# Patient Record
Sex: FEMALE | Race: WHITE | NOT HISPANIC OR LATINO | URBAN - METROPOLITAN AREA
[De-identification: names, ages, dates, MRNs, and addresses within clinical notes are randomized per-mention and may not be internally consistent; named-entity substitution may affect disease eponyms.]

---

## 2022-03-15 ENCOUNTER — COMPREHENSIVE EXAM (OUTPATIENT)
Dept: URBAN - METROPOLITAN AREA CLINIC 16 | Facility: CLINIC | Age: 62
End: 2022-03-15

## 2022-03-15 DIAGNOSIS — H25.13: ICD-10-CM

## 2022-03-15 DIAGNOSIS — H04.123: ICD-10-CM

## 2022-03-15 DIAGNOSIS — H43.812: ICD-10-CM

## 2022-03-15 DIAGNOSIS — Z01.00: ICD-10-CM

## 2022-03-15 DIAGNOSIS — H52.4: ICD-10-CM

## 2022-03-15 DIAGNOSIS — H52.03: ICD-10-CM

## 2022-03-15 DIAGNOSIS — H52.201: ICD-10-CM

## 2022-03-15 PROCEDURE — 92015 DETERMINE REFRACTIVE STATE: CPT

## 2022-03-15 PROCEDURE — 92004 COMPRE OPH EXAM NEW PT 1/>: CPT

## 2022-03-15 ASSESSMENT — KERATOMETRY
OS_AXISANGLE_DEGREES: 9
OD_AXISANGLE_DEGREES: 159
OS_K1POWER_DIOPTERS: 46.50
OD_AXISANGLE2_DEGREES: 69
OS_K2POWER_DIOPTERS: 47.50
OD_K1POWER_DIOPTERS: 46.50
OS_AXISANGLE2_DEGREES: 99
OD_K2POWER_DIOPTERS: 47.50

## 2022-03-15 ASSESSMENT — TONOMETRY
OD_IOP_MMHG: 15
OS_IOP_MMHG: 15

## 2022-03-15 NOTE — PATIENT DISCUSSION
Referred to Dr. Dat Kulkarni to explore options.  Texted him to tell him about her complaints and why I sent her over there.  Photodocumented.

## 2022-03-15 NOTE — PATIENT DISCUSSION
Revised spec rx.  Patient improved on the chart with the change and agreed that revised rx is improved.

## 2022-03-15 NOTE — PATIENT DISCUSSION
**** Patient saw Dr. 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

## 2023-05-08 ENCOUNTER — ESTABLISHED PATIENT (OUTPATIENT)
Dept: URBAN - METROPOLITAN AREA CLINIC 14 | Facility: CLINIC | Age: 63
End: 2023-05-08

## 2023-05-08 DIAGNOSIS — H25.13: ICD-10-CM

## 2023-05-08 DIAGNOSIS — H18.513: ICD-10-CM

## 2023-05-08 PROCEDURE — 92014 COMPRE OPH EXAM EST PT 1/>: CPT

## 2023-05-08 PROCEDURE — 92286 ANT SGM IMG I&R SPECLR MIC: CPT

## 2023-05-08 ASSESSMENT — VISUAL ACUITY
OS_BCVA: 20/400
OD_CC: 20/40
OD_BCVA: 20/25
OD_GLARE: 20/400
OS_CC: 20/400

## 2023-05-08 ASSESSMENT — TONOMETRY
OS_IOP_MMHG: 9
OD_IOP_MMHG: 14

## 2023-05-11 ENCOUNTER — ESTABLISHED PATIENT (OUTPATIENT)
Dept: URBAN - METROPOLITAN AREA CLINIC 14 | Facility: CLINIC | Age: 63
End: 2023-05-11

## 2023-05-11 DIAGNOSIS — Z98.890: ICD-10-CM

## 2023-05-11 DIAGNOSIS — H25.13: ICD-10-CM

## 2023-05-11 PROCEDURE — 92012 INTRM OPH EXAM EST PATIENT: CPT

## 2023-05-11 ASSESSMENT — VISUAL ACUITY
OD_CC: 20/30-1
OU_CC: 20/30-1
OS_CC: 20/200

## 2023-05-11 ASSESSMENT — TONOMETRY
OD_IOP_MMHG: 17
OS_IOP_MMHG: 11

## 2023-05-15 ENCOUNTER — FOLLOW UP (OUTPATIENT)
Dept: URBAN - METROPOLITAN AREA CLINIC 14 | Facility: CLINIC | Age: 63
End: 2023-05-15

## 2023-05-15 DIAGNOSIS — H25.13: ICD-10-CM

## 2023-05-15 DIAGNOSIS — Z98.890: ICD-10-CM

## 2023-05-15 PROCEDURE — 92012 INTRM OPH EXAM EST PATIENT: CPT

## 2023-05-15 ASSESSMENT — VISUAL ACUITY
OU_CC: 20/40
OD_CC: 20/40
OS_CC: 20/200

## 2023-05-15 ASSESSMENT — TONOMETRY
OD_IOP_MMHG: 14
OS_IOP_MMHG: 10

## 2023-05-22 ENCOUNTER — FOLLOW UP (OUTPATIENT)
Dept: URBAN - METROPOLITAN AREA CLINIC 14 | Facility: CLINIC | Age: 63
End: 2023-05-22

## 2023-05-22 DIAGNOSIS — H35.371: ICD-10-CM

## 2023-05-22 DIAGNOSIS — H25.13: ICD-10-CM

## 2023-05-22 DIAGNOSIS — H18.513: ICD-10-CM

## 2023-05-22 DIAGNOSIS — Z98.890: ICD-10-CM

## 2023-05-22 PROCEDURE — 92012 INTRM OPH EXAM EST PATIENT: CPT

## 2023-05-22 ASSESSMENT — VISUAL ACUITY
OU_CC: 20/40
OD_CC: 20/40
OS_CC: 20/400

## 2023-05-22 ASSESSMENT — TONOMETRY
OD_IOP_MMHG: 14
OS_IOP_MMHG: 10

## 2023-05-31 ENCOUNTER — FOLLOW UP (OUTPATIENT)
Dept: URBAN - METROPOLITAN AREA CLINIC 14 | Facility: CLINIC | Age: 63
End: 2023-05-31

## 2023-05-31 DIAGNOSIS — H25.13: ICD-10-CM

## 2023-05-31 DIAGNOSIS — H18.513: ICD-10-CM

## 2023-05-31 DIAGNOSIS — H35.371: ICD-10-CM

## 2023-05-31 DIAGNOSIS — Z98.890: ICD-10-CM

## 2023-05-31 PROCEDURE — 92012 INTRM OPH EXAM EST PATIENT: CPT

## 2023-05-31 ASSESSMENT — TONOMETRY
OD_IOP_MMHG: 14
OS_IOP_MMHG: 13

## 2023-05-31 ASSESSMENT — VISUAL ACUITY
OU_CC: 20/40
OS_CC: 20/200
OD_CC: 20/30-1

## 2023-06-12 ENCOUNTER — ESTABLISHED PATIENT (OUTPATIENT)
Dept: URBAN - METROPOLITAN AREA CLINIC 14 | Facility: CLINIC | Age: 63
End: 2023-06-12

## 2023-06-12 DIAGNOSIS — H18.513: ICD-10-CM

## 2023-06-12 DIAGNOSIS — H25.13: ICD-10-CM

## 2023-06-12 PROCEDURE — 99214 OFFICE O/P EST MOD 30 MIN: CPT

## 2023-06-12 PROCEDURE — 76510 OPH US DX B-SCAN&QUAN A-SCAN: CPT

## 2023-06-12 PROCEDURE — 92286 ANT SGM IMG I&R SPECLR MIC: CPT

## 2023-06-12 PROCEDURE — 92136 OPHTHALMIC BIOMETRY: CPT

## 2023-06-12 ASSESSMENT — KERATOMETRY
OS_K2POWER_DIOPTERS: 47.25
OS_K1POWER_DIOPTERS: 46.50
OD_K2POWER_DIOPTERS: 47.50
OD_K1POWER_DIOPTERS: 46.75
OD_AXISANGLE2_DEGREES: 67
OD_AXISANGLE_DEGREES: 157
OS_AXISANGLE_DEGREES: 28
OS_AXISANGLE2_DEGREES: 118

## 2023-06-12 ASSESSMENT — TONOMETRY
OS_IOP_MMHG: 14
OD_IOP_MMHG: 14

## 2023-06-12 ASSESSMENT — VISUAL ACUITY
OD_GLARE: 20/400
OD_BCVA: 20/25
OS_BCVA: 20/400
OD_CC: 20/30
OS_CC: 20/400

## 2023-06-21 ENCOUNTER — FOLLOW UP (OUTPATIENT)
Dept: URBAN - METROPOLITAN AREA CLINIC 14 | Facility: CLINIC | Age: 63
End: 2023-06-21

## 2023-06-21 DIAGNOSIS — H40.052: ICD-10-CM

## 2023-06-21 PROCEDURE — 92012 INTRM OPH EXAM EST PATIENT: CPT

## 2023-06-21 ASSESSMENT — KERATOMETRY
OD_AXISANGLE2_DEGREES: 67
OS_AXISANGLE_DEGREES: 28
OS_K1POWER_DIOPTERS: 46.50
OD_K1POWER_DIOPTERS: 46.75
OD_AXISANGLE_DEGREES: 157
OS_K2POWER_DIOPTERS: 47.25
OD_K2POWER_DIOPTERS: 47.50
OS_AXISANGLE2_DEGREES: 118

## 2023-06-21 ASSESSMENT — VISUAL ACUITY
OD_CC: 20/30
OS_PH: 20/80
OS_CC: 20/200

## 2023-06-21 ASSESSMENT — TONOMETRY
OS_IOP_MMHG: 15
OD_IOP_MMHG: 14

## 2023-06-29 ENCOUNTER — ESTABLISHED PATIENT (OUTPATIENT)
Dept: URBAN - METROPOLITAN AREA CLINIC 16 | Facility: CLINIC | Age: 63
End: 2023-06-29

## 2023-06-29 DIAGNOSIS — H25.13: ICD-10-CM

## 2023-06-29 DIAGNOSIS — H35.371: ICD-10-CM

## 2023-06-29 DIAGNOSIS — H35.431: ICD-10-CM

## 2023-06-29 PROCEDURE — 92014 COMPRE OPH EXAM EST PT 1/>: CPT

## 2023-06-29 PROCEDURE — 92134 CPTRZ OPH DX IMG PST SGM RTA: CPT

## 2023-06-29 PROCEDURE — 92201 OPSCPY EXTND RTA DRAW UNI/BI: CPT

## 2023-06-29 ASSESSMENT — VISUAL ACUITY
OS_CC: 20/100-1
OS_PH: 20/60
OD_CC: 20/20-1

## 2023-06-29 ASSESSMENT — TONOMETRY
OS_IOP_MMHG: 12
OD_IOP_MMHG: 16

## 2023-07-26 ENCOUNTER — POST-OP (OUTPATIENT)
Dept: URBAN - METROPOLITAN AREA CLINIC 16 | Facility: CLINIC | Age: 63
End: 2023-07-26

## 2023-07-26 DIAGNOSIS — Z96.1: ICD-10-CM

## 2023-07-26 PROCEDURE — 99024NOCM NON COMANAGED POST OP CARE

## 2023-07-26 ASSESSMENT — VISUAL ACUITY
OD_CC: 20/20
OS_SC: 20/30-1
OD_SC: 20/200

## 2023-07-26 ASSESSMENT — TONOMETRY
OD_IOP_MMHG: 16
OS_IOP_MMHG: 11

## 2023-08-07 ENCOUNTER — POST-OP (OUTPATIENT)
Dept: URBAN - METROPOLITAN AREA CLINIC 14 | Facility: CLINIC | Age: 63
End: 2023-08-07

## 2023-08-07 DIAGNOSIS — Z96.1: ICD-10-CM

## 2023-08-07 DIAGNOSIS — H25.11: ICD-10-CM

## 2023-08-07 PROCEDURE — 92136 OPHTHALMIC BIOMETRY: CPT

## 2023-08-07 PROCEDURE — 99024 POSTOP FOLLOW-UP VISIT: CPT

## 2023-08-07 ASSESSMENT — TONOMETRY: OS_IOP_MMHG: 14

## 2023-08-07 ASSESSMENT — VISUAL ACUITY
OS_BCVA: 20/30
OS_SC: 20/40
OD_CC: 20/20

## 2023-08-17 ENCOUNTER — POST-OP (OUTPATIENT)
Dept: URBAN - METROPOLITAN AREA CLINIC 14 | Facility: CLINIC | Age: 63
End: 2023-08-17

## 2023-08-17 DIAGNOSIS — Z96.1: ICD-10-CM

## 2023-08-17 PROCEDURE — 99024 POSTOP FOLLOW-UP VISIT: CPT

## 2023-08-17 ASSESSMENT — TONOMETRY
OD_IOP_MMHG: 11
OS_IOP_MMHG: 9

## 2023-08-17 ASSESSMENT — VISUAL ACUITY
OS_PH: 20/40
OS_SC: 20/60-1
OD_SC: 20/70+1
OD_PH: 20/40

## 2023-09-18 ENCOUNTER — POST-OP (OUTPATIENT)
Dept: URBAN - METROPOLITAN AREA CLINIC 14 | Facility: CLINIC | Age: 63
End: 2023-09-18

## 2023-09-18 DIAGNOSIS — H35.352: ICD-10-CM

## 2023-09-18 DIAGNOSIS — Z96.1: ICD-10-CM

## 2023-09-18 PROCEDURE — 99024 POSTOP FOLLOW-UP VISIT: CPT

## 2023-09-18 RX ORDER — PREDNISOLONE ACETATE 10 MG/ML: 1 SUSPENSION/ DROPS OPHTHALMIC

## 2023-09-18 RX ORDER — BROMFENAC SODIUM 0.7 MG/ML: 1 SOLUTION/ DROPS OPHTHALMIC ONCE A DAY

## 2023-09-18 ASSESSMENT — KERATOMETRY
OS_K2POWER_DIOPTERS: 47.75
OD_K2POWER_DIOPTERS: 48.00
OS_AXISANGLE_DEGREES: 23
OD_K1POWER_DIOPTERS: 47.00
OD_AXISANGLE_DEGREES: 160
OS_K1POWER_DIOPTERS: 47.00
OS_AXISANGLE2_DEGREES: 113
OD_AXISANGLE2_DEGREES: 70

## 2023-09-18 ASSESSMENT — TONOMETRY
OD_IOP_MMHG: 10
OS_IOP_MMHG: 15

## 2023-09-18 ASSESSMENT — VISUAL ACUITY
OD_BCVA: 20/25
OD_SC: 20/50
OS_BCVA: 20/60
OD_PH: 20/40
OS_SC: 20/100

## 2023-09-22 ENCOUNTER — ESTABLISHED PATIENT (OUTPATIENT)
Dept: URBAN - METROPOLITAN AREA CLINIC 11 | Facility: CLINIC | Age: 63
End: 2023-09-22

## 2023-09-22 DIAGNOSIS — H35.352: ICD-10-CM

## 2023-09-22 DIAGNOSIS — H43.811: ICD-10-CM

## 2023-09-22 DIAGNOSIS — H35.431: ICD-10-CM

## 2023-09-22 DIAGNOSIS — H35.371: ICD-10-CM

## 2023-09-22 PROCEDURE — 92201 OPSCPY EXTND RTA DRAW UNI/BI: CPT

## 2023-09-22 PROCEDURE — 99213 OFFICE O/P EST LOW 20 MIN: CPT

## 2023-09-22 PROCEDURE — 92134 CPTRZ OPH DX IMG PST SGM RTA: CPT

## 2023-09-22 ASSESSMENT — KERATOMETRY
OD_AXISANGLE_DEGREES: 160
OS_AXISANGLE_DEGREES: 23
OD_K2POWER_DIOPTERS: 48.00
OS_AXISANGLE2_DEGREES: 113
OD_AXISANGLE2_DEGREES: 70
OS_K2POWER_DIOPTERS: 47.75
OD_K1POWER_DIOPTERS: 47.00
OS_K1POWER_DIOPTERS: 47.00

## 2023-09-22 ASSESSMENT — VISUAL ACUITY
OD_SC: 20/50
OS_SC: 20/100

## 2023-09-22 ASSESSMENT — TONOMETRY
OS_IOP_MMHG: 15
OD_IOP_MMHG: 13

## 2023-10-05 ENCOUNTER — ESTABLISHED PATIENT (OUTPATIENT)
Dept: URBAN - METROPOLITAN AREA CLINIC 16 | Facility: CLINIC | Age: 63
End: 2023-10-05

## 2023-10-05 DIAGNOSIS — H35.371: ICD-10-CM

## 2023-10-05 DIAGNOSIS — H35.431: ICD-10-CM

## 2023-10-05 DIAGNOSIS — H35.352: ICD-10-CM

## 2023-10-05 DIAGNOSIS — H43.811: ICD-10-CM

## 2023-10-05 PROCEDURE — 92134 CPTRZ OPH DX IMG PST SGM RTA: CPT

## 2023-10-05 PROCEDURE — 99213 OFFICE O/P EST LOW 20 MIN: CPT

## 2023-10-05 ASSESSMENT — KERATOMETRY
OS_K1POWER_DIOPTERS: 47.00
OS_AXISANGLE2_DEGREES: 113
OD_AXISANGLE_DEGREES: 160
OS_AXISANGLE_DEGREES: 23
OD_AXISANGLE2_DEGREES: 70
OD_K1POWER_DIOPTERS: 47.00
OD_K2POWER_DIOPTERS: 48.00
OS_K2POWER_DIOPTERS: 47.75

## 2023-10-05 ASSESSMENT — TONOMETRY
OD_IOP_MMHG: 14
OS_IOP_MMHG: 14

## 2023-10-05 ASSESSMENT — VISUAL ACUITY
OS_SC: 20/80-2
OD_SC: 20/40

## 2023-10-19 ENCOUNTER — ESTABLISHED PATIENT (OUTPATIENT)
Dept: URBAN - METROPOLITAN AREA CLINIC 14 | Facility: CLINIC | Age: 63
End: 2023-10-19

## 2023-10-19 DIAGNOSIS — H35.352: ICD-10-CM

## 2023-10-19 PROCEDURE — 92015 DETERMINE REFRACTIVE STATE: CPT

## 2023-10-19 PROCEDURE — 92012 INTRM OPH EXAM EST PATIENT: CPT

## 2023-10-19 ASSESSMENT — VISUAL ACUITY
OD_SC: 20/70
OD_BCVA: 20/25
OS_SC: 20/50
OS_BCVA: 20/25

## 2023-10-20 ASSESSMENT — TONOMETRY
OS_IOP_MMHG: 14
OD_IOP_MMHG: 14

## 2023-11-02 ENCOUNTER — ESTABLISHED PATIENT (OUTPATIENT)
Dept: URBAN - METROPOLITAN AREA CLINIC 16 | Facility: CLINIC | Age: 63
End: 2023-11-02

## 2023-11-02 DIAGNOSIS — H35.371: ICD-10-CM

## 2023-11-02 DIAGNOSIS — H35.352: ICD-10-CM

## 2023-11-02 DIAGNOSIS — H35.431: ICD-10-CM

## 2023-11-02 DIAGNOSIS — H43.811: ICD-10-CM

## 2023-11-02 PROCEDURE — 92134 CPTRZ OPH DX IMG PST SGM RTA: CPT

## 2023-11-02 PROCEDURE — 99213 OFFICE O/P EST LOW 20 MIN: CPT

## 2023-11-02 ASSESSMENT — VISUAL ACUITY
OU_PH: 20/30
OS_PH: 20/30
OS_SC: 20/50
OD_PH: 20/30
OU_SC: 20/50
OD_SC: 20/50

## 2023-11-02 ASSESSMENT — TONOMETRY
OS_IOP_MMHG: 10
OD_IOP_MMHG: 9

## 2023-11-27 ENCOUNTER — FOLLOW UP (OUTPATIENT)
Dept: URBAN - METROPOLITAN AREA CLINIC 14 | Facility: CLINIC | Age: 63
End: 2023-11-27

## 2023-11-27 DIAGNOSIS — H35.352: ICD-10-CM

## 2023-11-27 DIAGNOSIS — Z98.890: ICD-10-CM

## 2023-11-27 DIAGNOSIS — H35.431: ICD-10-CM

## 2023-11-27 DIAGNOSIS — H35.371: ICD-10-CM

## 2023-11-27 DIAGNOSIS — H43.811: ICD-10-CM

## 2023-11-27 PROCEDURE — 99213 OFFICE O/P EST LOW 20 MIN: CPT

## 2023-11-27 PROCEDURE — 92015 DETERMINE REFRACTIVE STATE: CPT

## 2023-11-27 ASSESSMENT — VISUAL ACUITY
OS_SC: 20/60
OS_PH: 20/40
OS_BCVA: 20/20
OD_BCVA: 20/20
OD_PH: 20/25
OD_SC: 20/50

## 2023-11-27 ASSESSMENT — TONOMETRY
OS_IOP_MMHG: 18
OD_IOP_MMHG: 13

## 2023-12-14 ENCOUNTER — FOLLOW UP (OUTPATIENT)
Dept: URBAN - METROPOLITAN AREA CLINIC 16 | Facility: CLINIC | Age: 63
End: 2023-12-14

## 2023-12-14 DIAGNOSIS — H35.352: ICD-10-CM

## 2023-12-14 DIAGNOSIS — H35.371: ICD-10-CM

## 2023-12-14 DIAGNOSIS — H35.431: ICD-10-CM

## 2023-12-14 DIAGNOSIS — H43.811: ICD-10-CM

## 2023-12-14 PROCEDURE — 99214 OFFICE O/P EST MOD 30 MIN: CPT

## 2023-12-14 PROCEDURE — 67515 INJECT/TREAT EYE SOCKET: CPT

## 2023-12-14 PROCEDURE — 92134 CPTRZ OPH DX IMG PST SGM RTA: CPT

## 2023-12-14 ASSESSMENT — TONOMETRY
OS_IOP_MMHG: 18
OD_IOP_MMHG: 15

## 2023-12-14 ASSESSMENT — VISUAL ACUITY
OS_PH: 20/25
OD_PH: 20/30
OD_SC: 20/40
OS_SC: 20/60

## 2024-01-25 ENCOUNTER — FOLLOW UP (OUTPATIENT)
Dept: URBAN - METROPOLITAN AREA CLINIC 16 | Facility: CLINIC | Age: 64
End: 2024-01-25

## 2024-01-25 DIAGNOSIS — H35.371: ICD-10-CM

## 2024-01-25 DIAGNOSIS — H35.431: ICD-10-CM

## 2024-01-25 DIAGNOSIS — H43.811: ICD-10-CM

## 2024-01-25 PROCEDURE — 92134 CPTRZ OPH DX IMG PST SGM RTA: CPT

## 2024-01-25 PROCEDURE — 99213 OFFICE O/P EST LOW 20 MIN: CPT

## 2024-01-25 ASSESSMENT — VISUAL ACUITY
OD_SC: 20/40-1
OS_SC: 20/30

## 2024-01-25 ASSESSMENT — TONOMETRY
OS_IOP_MMHG: 20
OD_IOP_MMHG: 13

## 2024-03-28 ENCOUNTER — FOLLOW UP (OUTPATIENT)
Dept: URBAN - METROPOLITAN AREA CLINIC 16 | Facility: CLINIC | Age: 64
End: 2024-03-28

## 2024-03-28 DIAGNOSIS — H35.371: ICD-10-CM

## 2024-03-28 DIAGNOSIS — H43.811: ICD-10-CM

## 2024-03-28 DIAGNOSIS — H35.431: ICD-10-CM

## 2024-03-28 PROCEDURE — 99213 OFFICE O/P EST LOW 20 MIN: CPT

## 2024-03-28 PROCEDURE — 92134 CPTRZ OPH DX IMG PST SGM RTA: CPT

## 2024-03-28 ASSESSMENT — TONOMETRY
OD_IOP_MMHG: 12
OS_IOP_MMHG: 18

## 2024-03-28 ASSESSMENT — VISUAL ACUITY
OD_CC: 20/20
OS_CC: 20/25

## 2024-05-09 ENCOUNTER — FOLLOW UP (OUTPATIENT)
Dept: URBAN - METROPOLITAN AREA CLINIC 16 | Facility: CLINIC | Age: 64
End: 2024-05-09

## 2024-05-09 DIAGNOSIS — H43.811: ICD-10-CM

## 2024-05-09 DIAGNOSIS — H35.431: ICD-10-CM

## 2024-05-09 DIAGNOSIS — H35.373: ICD-10-CM

## 2024-05-09 PROCEDURE — 92134 CPTRZ OPH DX IMG PST SGM RTA: CPT

## 2024-05-09 PROCEDURE — 99213 OFFICE O/P EST LOW 20 MIN: CPT

## 2024-05-09 ASSESSMENT — TONOMETRY
OD_IOP_MMHG: 10
OS_IOP_MMHG: 12

## 2024-05-09 ASSESSMENT — VISUAL ACUITY
OS_PH: 20/25
OD_SC: 20/40
OS_SC: 20/40
OD_PH: 20/30

## 2024-08-16 ENCOUNTER — COMPREHENSIVE EXAM (OUTPATIENT)
Dept: URBAN - METROPOLITAN AREA CLINIC 11 | Facility: CLINIC | Age: 64
End: 2024-08-16

## 2024-08-16 DIAGNOSIS — H35.431: ICD-10-CM

## 2024-08-16 DIAGNOSIS — H43.811: ICD-10-CM

## 2024-08-16 DIAGNOSIS — H35.373: ICD-10-CM

## 2024-08-16 DIAGNOSIS — H11.32: ICD-10-CM

## 2024-08-16 PROCEDURE — 92134 CPTRZ OPH DX IMG PST SGM RTA: CPT

## 2024-08-16 PROCEDURE — 92201 OPSCPY EXTND RTA DRAW UNI/BI: CPT

## 2024-08-16 PROCEDURE — 92014 COMPRE OPH EXAM EST PT 1/>: CPT

## 2024-08-16 ASSESSMENT — TONOMETRY
OS_IOP_MMHG: 17
OD_IOP_MMHG: 10

## 2024-08-16 ASSESSMENT — VISUAL ACUITY
OS_CC: 20/30-2
OD_CC: 20/25+2
OU_CC: 20/20-2

## 2025-02-18 ENCOUNTER — COMPREHENSIVE EXAM (OUTPATIENT)
Age: 65
End: 2025-02-18

## 2025-02-18 DIAGNOSIS — H35.431: ICD-10-CM

## 2025-02-18 DIAGNOSIS — H35.371: ICD-10-CM

## 2025-02-18 DIAGNOSIS — H43.811: ICD-10-CM

## 2025-02-18 DIAGNOSIS — H50.22: ICD-10-CM

## 2025-02-18 DIAGNOSIS — H18.513: ICD-10-CM

## 2025-02-18 DIAGNOSIS — H52.223: ICD-10-CM

## 2025-02-18 DIAGNOSIS — H35.373: ICD-10-CM

## 2025-02-18 PROCEDURE — 92015 DETERMINE REFRACTIVE STATE: CPT

## 2025-02-18 PROCEDURE — 92014 COMPRE OPH EXAM EST PT 1/>: CPT

## 2025-02-18 PROCEDURE — 92134 CPTRZ OPH DX IMG PST SGM RTA: CPT

## 2025-02-19 ENCOUNTER — FOLLOW UP (OUTPATIENT)
Age: 65
End: 2025-02-19

## 2025-02-19 DIAGNOSIS — H50.22: ICD-10-CM

## 2025-02-19 PROCEDURE — 99213 OFFICE O/P EST LOW 20 MIN: CPT

## 2025-06-12 ENCOUNTER — EMERGENCY VISIT (OUTPATIENT)
Age: 65
End: 2025-06-12